# Patient Record
Sex: FEMALE | Race: WHITE | NOT HISPANIC OR LATINO | URBAN - METROPOLITAN AREA
[De-identification: names, ages, dates, MRNs, and addresses within clinical notes are randomized per-mention and may not be internally consistent; named-entity substitution may affect disease eponyms.]

---

## 2024-11-01 ENCOUNTER — APPOINTMENT (OUTPATIENT)
Dept: PRIMARY CARE | Facility: CLINIC | Age: 24
End: 2024-11-01
Payer: COMMERCIAL

## 2024-11-01 VITALS
DIASTOLIC BLOOD PRESSURE: 80 MMHG | SYSTOLIC BLOOD PRESSURE: 123 MMHG | HEART RATE: 102 BPM | OXYGEN SATURATION: 98 % | WEIGHT: 124.6 LBS

## 2024-11-01 DIAGNOSIS — Z00.00 ROUTINE GENERAL MEDICAL EXAMINATION AT A HEALTH CARE FACILITY: ICD-10-CM

## 2024-11-01 DIAGNOSIS — Z12.4 PAP SMEAR FOR CERVICAL CANCER SCREENING: Primary | ICD-10-CM

## 2024-11-01 LAB
NON-UH HIE A/G RATIO: 1.4
NON-UH HIE ALB: 4.2 G/DL (ref 3.4–5)
NON-UH HIE ALK PHOS: 50 UNIT/L (ref 45–117)
NON-UH HIE APPEARANCE, U: CLEAR
NON-UH HIE BILIRUBIN, TOTAL: 0.3 MG/DL (ref 0.3–1.2)
NON-UH HIE BILIRUBIN, U: NEGATIVE
NON-UH HIE BLOOD, U: NEGATIVE
NON-UH HIE BUN/CREAT RATIO: 17.8
NON-UH HIE BUN: 16 MG/DL (ref 9–23)
NON-UH HIE CALCIUM: 9.8 MG/DL (ref 8.7–10.4)
NON-UH HIE CALCULATED LDL CHOLESTEROL: 120 MG/DL (ref 60–130)
NON-UH HIE CALCULATED OSMOLALITY: 280 MOSM/KG (ref 275–295)
NON-UH HIE CHLORIDE: 107 MMOL/L (ref 98–107)
NON-UH HIE CHOLESTEROL: 191 MG/DL (ref 100–200)
NON-UH HIE CO2, VENOUS: 29 MMOL/L (ref 20–31)
NON-UH HIE COLOR, U: ABNORMAL
NON-UH HIE CREATININE: 0.9 MG/DL (ref 0.5–0.8)
NON-UH HIE FREE T4: 1.34 NG/DL (ref 0.89–1.76)
NON-UH HIE GFR AA: >60
NON-UH HIE GLOBULIN: 3.1 G/DL
NON-UH HIE GLOMERULAR FILTRATION RATE: >60 ML/MIN/1.73M?
NON-UH HIE GLUCOSE QUAL, U: NEGATIVE
NON-UH HIE GLUCOSE: 87 MG/DL (ref 74–106)
NON-UH HIE GOT: 16 UNIT/L (ref 15–37)
NON-UH HIE GPT: 11 UNIT/L (ref 10–49)
NON-UH HIE HCT: 38.4 % (ref 36–46)
NON-UH HIE HDL CHOLESTEROL: 49 MG/DL (ref 40–60)
NON-UH HIE HGB: 12.5 G/DL (ref 12–16)
NON-UH HIE INSTR WBC ND: 7
NON-UH HIE K: 4.1 MMOL/L (ref 3.5–5.1)
NON-UH HIE KETONES, U: NEGATIVE
NON-UH HIE LEUKOCYTE ESTERASE, U: ABNORMAL
NON-UH HIE MCH: 26.5 PG (ref 27–34)
NON-UH HIE MCHC: 32.7 G/DL (ref 32–37)
NON-UH HIE MCV: 81.2 FL (ref 80–100)
NON-UH HIE MPV: 8.1 FL (ref 7.4–10.4)
NON-UH HIE NA: 140 MMOL/L (ref 135–145)
NON-UH HIE NITRITE, U: NEGATIVE
NON-UH HIE PH, U: 6.5 (ref 4.5–8)
NON-UH HIE PLATELET: 292 X10 (ref 150–450)
NON-UH HIE PROTEIN, U: NEGATIVE
NON-UH HIE RBC/HPF, U: 1 #/HPF (ref 0–3)
NON-UH HIE RBC: 4.73 X10 (ref 4.2–5.4)
NON-UH HIE RDW: 13.7 % (ref 11.5–14.5)
NON-UH HIE SPECIFIC GRAVITY, U: 1.02 (ref 1–1.03)
NON-UH HIE SQUAMOUS EPITHELIAL CELLS, U: 3 #/HPF
NON-UH HIE TOTAL CHOL/HDL CHOL RATIO: 3.9
NON-UH HIE TOTAL PROTEIN: 7.3 G/DL (ref 5.7–8.2)
NON-UH HIE TRIGLYCERIDES: 111 MG/DL (ref 30–150)
NON-UH HIE TSH: 0.22 UIU/ML (ref 0.55–4.78)
NON-UH HIE U MICRO: ABNORMAL
NON-UH HIE UROBILINOGEN QUAL, U: ABNORMAL
NON-UH HIE WBC/HPF, U: 7 #/HPF (ref 0–5)
NON-UH HIE WBC: 7 X10 (ref 4.5–11)

## 2024-11-01 ASSESSMENT — PATIENT HEALTH QUESTIONNAIRE - PHQ9
8. MOVING OR SPEAKING SO SLOWLY THAT OTHER PEOPLE COULD HAVE NOTICED. OR THE OPPOSITE, BEING SO FIGETY OR RESTLESS THAT YOU HAVE BEEN MOVING AROUND A LOT MORE THAN USUAL: NOT AT ALL
4. FEELING TIRED OR HAVING LITTLE ENERGY: NOT AT ALL
SUM OF ALL RESPONSES TO PHQ9 QUESTIONS 1 AND 2: 0
3. TROUBLE FALLING OR STAYING ASLEEP OR SLEEPING TOO MUCH: NOT AT ALL
SUM OF ALL RESPONSES TO PHQ QUESTIONS 1-9: 0
5. POOR APPETITE OR OVEREATING: NOT AT ALL
10. IF YOU CHECKED OFF ANY PROBLEMS, HOW DIFFICULT HAVE THESE PROBLEMS MADE IT FOR YOU TO DO YOUR WORK, TAKE CARE OF THINGS AT HOME, OR GET ALONG WITH OTHER PEOPLE: NOT DIFFICULT AT ALL
2. FEELING DOWN, DEPRESSED OR HOPELESS: NOT AT ALL
1. LITTLE INTEREST OR PLEASURE IN DOING THINGS: NOT AT ALL
7. TROUBLE CONCENTRATING ON THINGS, SUCH AS READING THE NEWSPAPER OR WATCHING TELEVISION: NOT AT ALL
6. FEELING BAD ABOUT YOURSELF - OR THAT YOU ARE A FAILURE OR HAVE LET YOURSELF OR YOUR FAMILY DOWN: NOT AT ALL
9. THOUGHTS THAT YOU WOULD BE BETTER OFF DEAD, OR OF HURTING YOURSELF: NOT AT ALL

## 2024-11-04 ENCOUNTER — TELEPHONE (OUTPATIENT)
Dept: PRIMARY CARE | Facility: CLINIC | Age: 24
End: 2024-11-04
Payer: COMMERCIAL

## 2024-11-04 NOTE — TELEPHONE ENCOUNTER
----- Message from Luisana Fierro sent at 11/4/2024  1:40 PM EST -----  Abnormal TSH.  Patient need repeat TSH in 1 month.  Please place an order.  Please inform patient

## 2024-11-04 NOTE — TELEPHONE ENCOUNTER
PT stated verbal understanding regarding her TSH results and follow up taking them over in one month.

## 2024-12-19 LAB
NON-UH HIE FREE T4: 1.45 NG/DL (ref 0.89–1.76)
NON-UH HIE TSH: 0.24 UIU/ML (ref 0.55–4.78)

## 2024-12-20 ENCOUNTER — TELEPHONE (OUTPATIENT)
Dept: PRIMARY CARE | Facility: CLINIC | Age: 24
End: 2024-12-20
Payer: COMMERCIAL

## 2024-12-20 NOTE — TELEPHONE ENCOUNTER
----- Message from Luisana Fierro sent at 12/19/2024  5:54 PM EST -----  Persistently  low TSH.  Patient need to see endocrine for further management.

## 2024-12-27 DIAGNOSIS — R79.89 LOW TSH LEVEL: ICD-10-CM

## 2025-08-14 LAB
NON-UH HIE FREE T3: 3 PG/ML (ref 2.3–4.2)
NON-UH HIE FREE T4: 1.39 NG/DL (ref 0.89–1.76)
NON-UH HIE TSH: 0.19 UIU/ML (ref 0.55–4.78)

## 2025-08-16 LAB
NON-UH HIE THYROID (TPO) AB: 3.6 IU/ML (ref 0–9)
NON-UH HIE THYROID STIMULATING IMMUNOGLOBULIN- TSI: <0.1 IU/L
NON-UH HIE TSH RECEPTOR ANTIBODY: <1.1 IU/L